# Patient Record
(demographics unavailable — no encounter records)

---

## 2018-11-12 NOTE — DIAGNOSTIC IMAGING REPORT
#[id number] - MGSCRBIL

#BILATERAL DIGITAL SCREENING MAMMOGRAM WITH CAD: 10/19/2018

CLINICAL: Routine screening.  



No prior exams were available for comparison as study from 2016 is not reviewable because of PACS 

issues.  

Current study contains 4 films.  

There are scattered fibroglandular elements in both breasts.  

Current study was also evaluated with a Computer Aided Detection (CAD) system.  

There is a mole marker on the left breast.  

No significant masses, calcifications, or other findings are seen in either breast.  



IMPRESSION: NEGATIVE

There is no mammographic evidence of malignancy.  A 1 year screening mammogram is recommended. 

The patient will be notified by letter of the results.  





Nikolas Larkin Jr., D.O.          

cw/:11/9/2018 08:20:15  



Imaging Technologist: Sanjuana PA)(M), [facility]

letter sent: Normal Exam  

Mammogram BI-RADS: 1 Negative

## 2020-10-20 NOTE — DIAGNOSTIC IMAGING REPORT
TECHNIQUE: Frontal view of the chest.



INDICATION: 

^ERMD ORDER

^[id number]

^1225

^Y



COMPARISON:  None



IMPRESSION:

Lines and hardware: None.

Heart and mediastinum: Unremarkable.

Lungs and pleura: No focal airspace consolidation. No pleural effusion. No

pneumothorax.

Soft tissues and bones: No acute abnormality.









Signed by: Jasvir Paniagua MD on 10/20/2020 1:01 PM

## 2020-10-20 NOTE — XMS REPORT
Clinical Summary

                             Created on: 10/20/2020



Marla Blood

External Reference #: [id number]

: 1958

Sex: Female



Demographics





                          Address                   [address]

[place]  [address]

 

                          Home Phone                [phone]

 

                          Preferred Language        English

 

                          Marital Status            

 

                          Caodaism Affiliation     Unknown

 

                          Race                      White

 

                          Ethnic Group              Non-





Author





                          Author                    [facility]

 

                          Organization              [facility]

 

                          Address                   Unknown

 

                          Phone                     Unavailable







Support





                Name            Relationship    Address         Phone

 

                Vicente Blood ECON            Unknown         [phone]







Care Team Providers





                    Care Team Member Name Role                Phone

 

                    Faisal Reynoso MD PCP                 [phone]







Allergies

No Known Active Allergies



Medications

No known medications



Active Problems





Not on file



Surgical History





   



                 Surgery         Date            Site/Laterality  Comments

 

   



                           HEEL SPUR SURGERY         Right 







Social History





                                        Date



                 Tobacco Use     Types           Packs/Day       Years Used 

 

                                         



                                         Former Smoker    







                    Drinks/Week         oz/Week             Comments



                                         Alcohol Use   

 

                                                             



                                         No   







 



                           Sex Assigned at Birth     Date Recorded

 

 



                                         Not on file 







Last Filed Vital Signs

Not on file



Plan of Treatment





Not on file



Results

Not on fileafter 10/20/2019



Insurance





                                        Type



            Payer      Benefit    Subscriber ID  Effective  Phone      Address 



                           Plan /                    Dates   



                                         Group     

 

                                        PPO



                 BCBS            BCBS            [id number]    2017-P   



                           CHOICE                    resent   



                                         PPO/FEDJOYCELYN     



                                         L EMPL PPO     







     



            Guarantor Name  Account    Relation to  Date of    Phone      Ean huddleston Address



                     Type                Patient             Birth  

 

     



            Marla Blood  Personal/F  Self       1958  [phone]  [address]



                     [address]               (Home)              [place] [address]







Advance Directives





For more information, please contact: [phone]







                          Patient Representative    Explanation



                           Type                      Date Recorded  

 

                                                     



                           Advance Directives,       2017 12:28 PM  



                                         Living Will and   



                                         Medical Power of

## 2020-10-20 NOTE — XMS REPORT
Continuity of Care Document

                             Created on: 10/20/2020



TARSHA BLOOD

External Reference #: [id number]

: 1958

Sex: Female



Demographics





                          Address                   [address] [place]  [address]

 

                          Home Phone                [phone]

 

                          Preferred Language        English

 

                          Marital Status            Unknown

 

                          Jehovah's witness Affiliation     Unknown

 

                          Race                      Unknown

 

                                        Additional Race(s) 

White



 

                          Ethnic Group              Non-





Author





                          Author                    [facility]

t

 

                          Organization              [facility]

 

                          Address                   [address]

[place]  [address]



 

                          Phone                     Unavailable







Support





                Name            Relationship    Address         Phone

 

                Vicente Blood ECON            Unknown         [phone]

[phone]







Care Team Providers





                    Care Team Member Name Role                Phone

 

                    Stephen Reynoso MD PCP                 [phone]

 

                    RADHA BIRMINGHAM (BUDDY) Attphys             Unavailable







Problems

This patient has no known problems.



Allergies, Adverse Reactions, Alerts

This patient has no known allergies or adverse reactions.



Social History





           Social Habit Start Date Stop Date  Quantity   Comments   Source

 

           Sex Assigned At Birth                                             Ann-Marie diaz [employer]

 

                Alcohol intake  2017 00:00:00 2017 00:00:00 Current 

non-drinker of 

alcohol (finding)                                   Jellico Moravian







                Smoking Status  Start Date      Stop Date       Source

 

                Former smoker   2017 00:00:00 2017 00:00:00 Jellico 

[employer]







Medications

This patient has no known medications.



Procedures

This patient has no known procedures.



Results





           Test Description Test Time  Test Comments Results    Result Comments 

Source

 

                BONE DXA DUAL ENERGY 2019-10-21 15:04:00                        

                              

                                                [place] [address]      Patient Name: TARSHA BLOOD                                   MR 
#: [id number]                     : 1958                               
   Age/Sex: 61/F  Acct #: [id number]                              Req #: [id number]
[id number]  Adm Physician:                                                      
Ordered by: RADHA BIRMINGHAM MD (BUDDY)                            Report #: [id number]
[id number]        Location: [facility]                                   Room/Bed:         
           
________________________________________________________________________________

___________________    Procedure: [id number] DX/BONE DXA DUAL ENERGY  Exam Date: 
                           Exam Time:                                           
   REPORT STATUS: Signed    Exam: Bone mineral density study.      History:  
Osteopenia.      Comparison:  10/14/2016      Discussion: Evaluation of the left
hip and lumbar spine was performed utilizing   DEXA Hologic bone densitometer.  
    The study is technically adequate.      Left hip total bone mineral density:
0.852gm/cm2, T-score is -0.7, Z-score is   0.3.    Left hip femoral neck bone 
mineral density: 0.793gm/cm2, T-score is -0.5,   Z-score is 0.8.    Lumbar spine
total bone mineral density:0.842gm/cm2, T-score is-1.9, Z-score is   -0.4.      
 Impression:   1. Normal bone mineral density of the left hip, fracture risk is 
not increased.   2. Osteopenia of the lumbar spine, fracture risk is increased  
   The BMD change versus baseline is -9.9%     and the BMD change versus 
previous   -9.9%    .      Least significant change (LSC) for bone mineral 
density as provided by    is 0.023 g/cm2 for lumbar spine and 0.027 
g/cm2 for total hip.      10 -year fracture risk per WHO Fracture Risk 
Assessment Tool (FRAX) for:   Major osteoporotic fracture is 6.8%   Hip fracture
is 0.3%   The above fracture probability is calculated for an untreated patient.
Fracture   probably may be lower if the patient has received treatment. All 
treatment   decisions require clinical judgment and consideration of individual 
patient   factors, including patient preferences, comorbidities, previous drug 
use and   risk factors not captured in the FRAX model (e.g. frailty, falls, 
vitamin D   deficiency, increased bone turnover, interval significant decline in
BMD).         The patient's fracture risk is compared to an age-matched control.
     Medical evaluation for secondary causes of low bone bone mineral density 
may be   appropriate.      Correlate clinically for the necessity and timing of 
the next bone mineral   density study.            Signed by: Dr. Nathen Rainey M.D. on 10/21/2019 3:07 PM        Dictated By: NATHEN RAINEY MD, MD  
Electronically Signed By: NATHEN RAINEY MD, MD on 10/21/19 1507  Transcribed By: 
JAZIEL on 10/21/19 1507       COPY TO:   RADHA BIRMINGHAM MD (BUDDY)                

                     

 

                MAMMOGRAPHY DIGITAL SCR BILAT 2019-10-21 12:21:00               

                              

                                                         [place] [address]      Patient Name: TARSHA BLOOD                            
      MR #: [id number]                     : 1958                      
            Age/Sex: 61/F  Acct #: [id number]                              Req
#: [id number]  Adm Physician:                                                   
  Ordered by: RADHA BIRMINGHAM) MD                            Report #: [id number]
[id number]        Location: MAMMO                                   Room/Bed:         
           
________________________________________________________________________________

___________________    Procedure: [id number] MG/MAMMOGRAPHY DIGITAL SCR BILAT  
Exam Date: 10/21/19                            Exam Time: 1107                  
                           REPORT STATUS: Signed       #[id number] - MGSCRBIL
  #BILATERAL DIGITAL SCREENING MAMMOGRAM WITH CAD: 10/21/2019   CLINICAL: 
Routine screening.        Comparison is made to exams dated:  10/19/2018 
mammogram and 10/14/2016 mammogram - [facility].  
Current study contains 4 films.     There are scattered fibroglandular elements 
in both breasts.     Current study was also evaluated with a Computer Aided 
Detection (CAD) system.     There are benign vascular calcifications in the 
right breast.     No significant masses, calcifications, or other findings are 
seen in either breast.        IMPRESSION: BENIGN   There is no mammographic 
evidence of malignancy.  A 1 year screening mammogram is recommended.    The 
patient will be notified by letter of the results.           EDIE JUAREZ M.D. 
           ct/penrad:10/23/2019 16:37:58        Imaging Technologist: Sanjuana HERNANDEZ(LANCE)(DELMY), [facility]   letter sent: Normal Exam
    Mammogram BI-RADS: 2 Benign     Dictated By: EDIE JUAERZ MD  
Electronically Signed By: EDIE JUAREZ MD on 10/23/19 1637  Transcribed By: 
JOSEPH on 10/23/19 1637       COPY TO:   RADHA BIRMINGHAM MD (BUDDY)                 

                    

 

                MAMMOGRAPHY DIGITAL SCR BILAT 2018-10-19 11:45:00               

                              

                                                         [place] [address]      Patient Name: TARSHA BLOOD                            
      MR #: [id number]                     : 1958                      
            Age/Sex: 60/F  Acct #: [id number]                              Req
#: [id number]  Adm Physician:                                                   
  Ordered by: RADHA BIRMINGHAM MD (BUDDY)                            Report #: [id number]
[id number]        Location: MAMMO                                   Room/Bed:         
           
________________________________________________________________________________

___________________    Procedure: [id number] MG/MAMMOGRAPHY DIGITAL SCR BILAT  
Exam Date: 10/19/18                            Exam Time: 1036                  
                           REPORT STATUS: Signed       #[id number] - MGSCRBIL
  #BILATERAL DIGITAL SCREENING MAMMOGRAM WITH CAD: 10/19/2018   CLINICAL: 
Routine screening.        No prior exams were available for comparison as study 
from 2016 is not reviewable because of PACS    issues.     Current study 
contains 4 films.     There are scattered fibroglandular elements in both 
breasts.     Current study was also evaluated with a Computer Aided Detection 
(CAD) system.     There is a mole marker on the left breast.     No significant 
masses, calcifications, or other findings are seen in either breast.        
IMPRESSION: NEGATIVE   There is no mammographic evidence of malignancy.  A 1 
year screening mammogram is recommended.    The patient will be notified by 
letter of the results.           Reilly Larkin Jr., D.O.             
cw/:2018 08:20:15        Imaging Technologist: Sanjuana HERNANDEZ(LANCE)(DELMY), [facility]   letter sent: Normal Exam     Mammogram BI-RADS:
1 Negative     Dictated By: REILLY LARKIN DO  Electronically Signed By: REILLY LARKIN DO on 18  Transcribed By: JOSEPH on 18       COPY 
TO:   RADHA BIRMINGHAM MD (BUDDY)

## 2020-10-20 NOTE — EMERGENCY DEPARTMENT NOTE
History of Present Illnes


History of Present Illness


Chief Complaint:  Chest Pain


History of Present Illness


This is a 62 year old  female from Mammogram for a rapid response after

it was observed by the technologist to be pale and had momentarily passed out. 

Patient seen at area and noted to be pale. brought to ED bed 8 for further 

evaluation. Patient had donated plasma earlier this AM  .


Historian:  Patient


Arrival Mode:  Car


Onset (how long ago):  second(s)


Severity:  moderate


Onset quality:  sudden


Timing of current episode:  constant


Progression:  resolved


Chronicity:  new


Relieving factors:  none


Exacerbating factors:  none


Associated symptoms:  Reports syncope





Past Medical/Family History


Physician Review


I have reviewed the patient's past medical and family history.  Any updates have

been documented here.





Past Medical History


Recent Fever:  No


Clinical Suspicion of Infectio:  No


New/Unexplained Change in Ment:  No





Social History


Smoking Cessation:  Never Smoker


Alcohol Use:  None


Any Illegal Drug Use:  No





Review of Systems


Review of Systems


Constitutional:  Reports weakness


EENTM:  Reports no symptoms


Cardiovascular:  Reports no symptoms


Respiratory:  Reports no symptoms


Gastrointestinal:  Reports no symptoms


Genitourinary:  Reports no symptoms


Musculoskeletal:  Reports no symptoms


Integumentary:  Reports no symptoms


Neurological:  Reports no symptoms


Psychological:  Reports no symptoms


Endocrine:  Reports no symptoms


Hematological/Lymphatic:  Reports no symptoms





Physical Exam


Related Data


Allergies:  


Coded Allergies:  


     No Known Allergies (Unverified , 8/17/15)


Triage Vital Signs





Vital Signs








  Date Time  Temp Pulse Resp B/P (MAP) Pulse Ox O2 Delivery O2 Flow Rate FiO2


 


10/20/20 12:14 98.2 71 20 115/78 99 Room Air  








Vital signs reviewed:  Yes





Physical Exam


CONSTITUTIONAL





Constitutional:  Present well-developed, Present well-nourished


HENT


HENT:  Present normocephalic, Present atraumatic, Present oropharynx 

clear/moist, Present nose normal


HENT L/R:  Present left ext ear normal, Present right ext ear normal


EYES





Eyes:  Reports PERRL, Reports conjunctivae normal


NECK


Neck:  Present ROM normal


PULMONARY


Pulmonary:  Present effort normal, Present breath sounds normal


CARDIOVASCULAR





Cardiovascular:  Present regular rhythm, Present heart sounds normal, Present 

capillary refill normal, Present normal rate


GASTROINTESTINAL





Abdominal:  Present soft, Present nontender, Present bowel sounds normal


GENITOURINARY





Genitourinary:  Present exam deferred


SKIN


Skin:  Present warm, Present dry


MUSCULOSKELETAL





Musculoskeletal:  Present ROM normal


NEUROLOGICAL





Neurological:  Present alert, Present oriented x 3, Present no gross motor or 

sensory deficits


PSYCHOLOGICAL


Psychological:  Present mood/affect normal, Present judgement normal





Results


Laboratory


Lab results reviewed:  Yes





Imaging


Imaging results reviewed:  Yes


Impressions


                                        


                        [place] [address]








Patient Name: TARSHA FRIED                          MR #: [id number]             


: 1958                         Age/Sex: 62/F


Acct #: [id number]                    Req #: [id number]


Adm Physician:                                      


Ordered by: EDIE LEDESMA DO                    Report #: [id number] 


Location: ER                            Room/Bed:           


                                                                          ______


________________________________________________________________________________


_____________





Procedure: [id number] DX/CHEST SINGLE (PORTABLE)


Exam Date: 10/20/20                            Exam Time: 1225








                              REPORT STATUS: Signed





TECHNIQUE: Frontal view of the chest.





INDICATION: 


^ERMD ORDER


^[id number]


^1225


^Y





COMPARISON:  None





IMPRESSION:


Lines and hardware: None.


Heart and mediastinum: Unremarkable.


Lungs and pleura: No focal airspace consolidation. No pleural effusion. No


pneumothorax.


Soft tissues and bones: No acute abnormality.














Signed by: Jasvir Go MD on 10/20/2020 1:01 PM








Dictated By: JASVIR GO DO


Electronically Signed By: JASVIR GO DO on 10/20/20 130


Transcribed By: JAZIEL on 10/20/20 130 








COPY TO:   EDIE LEDESMA DO~





Procedures


12 Lead ECG Interpretation


ECG Interpretation :  


   ECG:  ECG 1


   :  Interpreted by ED physician


   Date:  Oct 20, 2020


   Time:  12:18


   Prior ECG tracings:  reviewed


   Rhythm:  sinus tachycardia


   Rate:  normal


   BPM:  73


   QRS axis:  normal


   ST segments normal:  Yes


   T waves normal:  No


   T wave elevation:  V1-V6


   Clinical Impression:  non-specific ECG





Assessment & Plan


Medical Decision Making


MDM


Diff Dx : ACS, electrolye abl, hypovolumia, shock and anemia





Assessment & Plan


Final Impression:  


(1) Pre-syncope


Depart Disposition:  HOME, SELF-CARE


Last Vital Signs











  Date Time  Temp Pulse Resp B/P (MAP) Pulse Ox O2 Delivery O2 Flow Rate FiO2


 


10/20/20 12:14 98.2 71 20 115/78 99 Room Air  








Home Meds


No Active Prescriptions or Reported Meds











EDIE LEDESMA DO                Oct 20, 2020 12:19

## 2020-11-02 NOTE — DIAGNOSTIC IMAGING REPORT
Exam: Bone mineral density study.



History:  Osteopenia.



Comparison:  10/21/2019



Discussion: Evaluation of the left hip and lumbar spine was performed utilizing

DEXA Hologic bone densitometer. 



The study is technically adequate.



Left hip total bone mineral density: 0.860gm/cm2, T-score is -0.7, Z-score is

0.4. 

Left hip femoral neck bone mineral density: 0.785gm/cm2, T-score is -0.6,

Z-score is 0.8. 

Lumbar spine total bone mineral density:0.824gm/cm2, T-score is-2.0, Z-score is

-0.4.  



Impression:

1. Normal bone mineral density of the left hip, fracture risk is not increased.

2. Osteopenia of the lumbar spine, fracture risk is increased



The BMD change versus baseline is -9.0%     and the BMD change versus previous

1.0%    .



Least significant change (LSC) for bone mineral density as provided by

 is 0.023 g/cm2 for lumbar spine and 0.027 g/cm2 for total hip.



10 -year fracture risk per WHO Fracture Risk Assessment Tool (FRAX) for:

Major osteoporotic fracture is 6.9%

Hip fracture is 0.3%

The above fracture probability is calculated for an untreated patient. Fracture

probably may be lower if the patient has received treatment. All treatment

decisions require clinical judgment and consideration of individual patient

factors, including patient preferences, comorbidities, previous drug use and

risk factors not captured in the FRAX model (e.g. frailty, falls, vitamin D

deficiency, increased bone turnover, interval significant decline in BMD).





The patient's fracture risk is compared to an age-matched control.



Medical evaluation for secondary causes of low bone bone mineral density may be

appropriate.



Correlate clinically for the necessity and timing of the next bone mineral

density study.







Signed by: Dr. Nathen Rainey M.D. on 11/2/2020 10:01 AM